# Patient Record
Sex: FEMALE | Race: WHITE | Employment: FULL TIME | URBAN - METROPOLITAN AREA
[De-identification: names, ages, dates, MRNs, and addresses within clinical notes are randomized per-mention and may not be internally consistent; named-entity substitution may affect disease eponyms.]

---

## 2021-06-11 ENCOUNTER — HOSPITAL ENCOUNTER (OUTPATIENT)
Dept: MAMMOGRAPHY | Age: 56
Discharge: HOME | End: 2021-06-11
Attending: FAMILY MEDICINE
Payer: COMMERCIAL

## 2021-06-11 DIAGNOSIS — Z12.31 VISIT FOR SCREENING MAMMOGRAM: ICD-10-CM

## 2021-06-11 PROCEDURE — 77067 SCR MAMMO BI INCL CAD: CPT

## 2022-06-20 RX ORDER — ATORVASTATIN CALCIUM 10 MG/1
TABLET, FILM COATED ORAL
Qty: 30 TABLET | OUTPATIENT
Start: 2022-06-20

## 2022-06-28 ENCOUNTER — TELEPHONE (OUTPATIENT)
Dept: PRIMARY CARE CLINIC | Facility: CLINIC | Age: 57
End: 2022-06-28

## 2022-06-28 RX ORDER — ATORVASTATIN CALCIUM 10 MG/1
10 TABLET, FILM COATED ORAL DAILY
Qty: 30 TABLET | Refills: 3 | Status: SHIPPED | OUTPATIENT
Start: 2022-06-28 | End: 2022-10-14 | Stop reason: SDUPTHER

## 2022-08-18 RX ORDER — LISINOPRIL 10 MG/1
TABLET ORAL
Qty: 30 TABLET | Refills: 5 | Status: SHIPPED | OUTPATIENT
Start: 2022-08-18 | End: 2022-10-14 | Stop reason: SDUPTHER

## 2022-09-21 RX ORDER — ATORVASTATIN CALCIUM 10 MG/1
10 TABLET, FILM COATED ORAL DAILY
Qty: 30 TABLET | Refills: 3 | OUTPATIENT
Start: 2022-09-21

## 2022-10-13 ENCOUNTER — TELEPHONE (OUTPATIENT)
Dept: PRIMARY CARE CLINIC | Facility: CLINIC | Age: 57
End: 2022-10-13

## 2022-10-13 NOTE — TELEPHONE ENCOUNTER
[facility]  Subject: Refill Request     QUESTIONS   Name of Medication? atorvastatin (LIPITOR) 10 MG tablet   Patient-reported dosage and instructions? 1 TABLET A DAY   How many days do you have left? 0   [id number]   Pharmacy phone number (if available)? [phone]   Additional Information for Provider? PATIENT HAS NO MORE REFILLS AND IS   COMPLETLY OUT OF THIS MEDICATION   ---------------------------------------------------------------------------   --------------   CALL BACK INFO   What is the best way for the office to contact you? OK to leave message on   voicemail   Preferred Call Back Phone Number?  [phone]

## 2022-10-14 ENCOUNTER — TELEPHONE (OUTPATIENT)
Dept: PRIMARY CARE CLINIC | Facility: CLINIC | Age: 57
End: 2022-10-14

## 2022-10-14 RX ORDER — ATORVASTATIN CALCIUM 10 MG/1
10 TABLET, FILM COATED ORAL DAILY
Qty: 30 TABLET | Refills: 5 | Status: SHIPPED | OUTPATIENT
Start: 2022-10-14

## 2022-10-14 RX ORDER — LISINOPRIL 10 MG/1
TABLET ORAL
Qty: 30 TABLET | Refills: 5 | Status: SHIPPED | OUTPATIENT
Start: 2022-10-14

## 2022-10-14 NOTE — TELEPHONE ENCOUNTER
Message  Received: 2 days ago  [facility]  Subject: Refill Request     QUESTIONS   Name of Medication? atorvastatin (LIPITOR) 10 MG tablet   Patient-reported dosage and instructions? 1 TABLET A DAY   How many days do you have left? 0   [id number]   Pharmacy phone number (if available)? [phone]   Additional Information for Provider? PATIENT HAS NO MORE REFILLS AND IS   COMPLETLY OUT OF THIS MEDICATION   ---------------------------------------------------------------------------   --------------   CALL BACK INFO   What is the best way for the office to contact you? OK to leave message on   voicemail   Preferred Call Back Phone Number? [phone]   ---------------------------------------------------------------------------   --------------   SCRIPT ANSWERS   Relationship to Patient?  Self

## 2022-11-09 ENCOUNTER — TELEPHONE (OUTPATIENT)
Dept: PRIMARY CARE CLINIC | Facility: CLINIC | Age: 57
End: 2022-11-09

## 2022-11-09 RX ORDER — METHYLPREDNISOLONE 4 MG/1
TABLET ORAL
Qty: 1 KIT | Refills: 0 | Status: SHIPPED | OUTPATIENT
Start: 2022-11-09 | End: 2022-11-15

## 2022-11-09 NOTE — TELEPHONE ENCOUNTER
She is having sciatic pain and nothing is helping. Used heating pad, biofreeze, icyhot and tylenol but nothing is helping.   What can you recommend that she try? [phone]

## 2022-11-15 ENCOUNTER — TELEPHONE (OUTPATIENT)
Dept: PRIMARY CARE CLINIC | Facility: CLINIC | Age: 57
End: 2022-11-15

## 2022-11-15 NOTE — TELEPHONE ENCOUNTER
Patient called and stated that her left leg pain is not any better. I offered to schedule her for an appointment and she stated that she could not get off work but she wants him to give her something else. Please call her at [phone].

## 2022-11-16 ENCOUNTER — TELEMEDICINE (OUTPATIENT)
Dept: PRIMARY CARE CLINIC | Facility: CLINIC | Age: 57
End: 2022-11-16

## 2022-11-16 DIAGNOSIS — M54.42 ACUTE LEFT-SIDED LOW BACK PAIN WITH LEFT-SIDED SCIATICA: Primary | ICD-10-CM

## 2022-11-16 DIAGNOSIS — N18.31 STAGE 3A CHRONIC KIDNEY DISEASE (HCC): ICD-10-CM

## 2022-11-16 PROBLEM — N18.30 CHRONIC RENAL DISEASE, STAGE III (HCC): Status: ACTIVE | Noted: 2022-11-16

## 2022-11-16 PROCEDURE — 99442 PR PHYS/QHP TELEPHONE EVALUATION 11-20 MIN: CPT | Performed by: FAMILY MEDICINE

## 2022-11-16 RX ORDER — TIZANIDINE 4 MG/1
4 TABLET ORAL EVERY 6 HOURS PRN
Qty: 30 TABLET | Refills: 2 | Status: SHIPPED | OUTPATIENT
Start: 2022-11-16

## 2022-11-16 ASSESSMENT — PATIENT HEALTH QUESTIONNAIRE - PHQ9
2. FEELING DOWN, DEPRESSED OR HOPELESS: 0
SUM OF ALL RESPONSES TO PHQ QUESTIONS 1-9: 0
1. LITTLE INTEREST OR PLEASURE IN DOING THINGS: 0
SUM OF ALL RESPONSES TO PHQ9 QUESTIONS 1 & 2: 0

## 2022-11-16 NOTE — PROGRESS NOTES
Zachariah Orozco is a 61 y.o. female evaluated via telephone on 11/16/2022 for Leg Pain (Pt that she is having leg pain, and sometimes it feels numb.)  . Documentation:  I communicated with the patient and/or health care decision maker about leg pain. Pt says could not go to work today, left leg toward hip, sciatic nerve, lower part of stomach base of abdomen. Pt says working and in pain. Pain goes down leg from form l2-L3 to hip to left leg above hip. Pt says sits a lot at work and was so bad she could not go to work today, pain was severe. Pt says leg seemed to be catching in am, hard to walk at first.    Pt is given some stretches for back and hip, also given tizanidine for muscle spasm. Pt has a knot on back in area of concern. Pt is referred to orthopaedic for evaluation. RTC in 1 week for recheck. Details of this discussion including any medical advice provided: as needed    Total Time: minutes: 11-20 minutes    Zachariah Orozco was evaluated through a synchronous (real-time) audio encounter. Patient identification was verified at the start of the visit. She (or guardian if applicable) is aware that this is a billable service, which includes applicable co-pays. This visit was conducted with the patient's (and/or legal guardian's) verbal consent. She has not had a related appointment within my department in the past 7 days or scheduled within the next 24 hours. The patient was located at Home: [address] [place] [address] [place] [address]. The provider was located at  ([address] [place], [place] [address]): [place] [facility]  [address]  [address]     Note: not billable if this call serves to triage the patient into an appointment for the relevant concern    Basilio Rausch MD

## 2022-11-18 ENCOUNTER — TELEPHONE (OUTPATIENT)
Dept: PRIMARY CARE CLINIC | Facility: CLINIC | Age: 57
End: 2022-11-18

## 2022-11-18 DIAGNOSIS — M54.50 ACUTE LOW BACK PAIN, UNSPECIFIED BACK PAIN LATERALITY, UNSPECIFIED WHETHER SCIATICA PRESENT: Primary | ICD-10-CM

## 2022-11-18 RX ORDER — MELOXICAM 15 MG/1
15 TABLET ORAL DAILY
Qty: 30 TABLET | Refills: 2 | Status: SHIPPED | OUTPATIENT
Start: 2022-11-18

## 2022-11-18 NOTE — TELEPHONE ENCOUNTER
----- Message from Chuy Casiano sent at 11/17/2022 11:33 AM EST -----  Subject: Message to Provider    QUESTIONS  Information for Provider? Nora Escobedo is wanting to know if she can get   prescribed an antinflammatory instead of muscle relaxer for her psiatic   nerve. The muscle relaxer is not working. She has been taking Tylenol and   using heating pad and that is not working either. Her pharmacy is   [facility] in [facility]. She also needs doctor note for missing work for   11/16, 11/17, and 11/18.  ---------------------------------------------------------------------------  --------------  [address] [employer]  [phone]; OK to leave message on voicemail  ---------------------------------------------------------------------------  --------------  SCRIPT ANSWERS  Relationship to Patient?  Self

## 2022-11-21 ENCOUNTER — TELEPHONE (OUTPATIENT)
Dept: PRIMARY CARE CLINIC | Facility: CLINIC | Age: 57
End: 2022-11-21

## 2022-11-21 NOTE — TELEPHONE ENCOUNTER
Please call patient concerning her back pain and the medication that DR. Mack Harada sent to the pharmacist.  She can be reached at [phone].

## 2022-11-23 ENCOUNTER — TELEMEDICINE (OUTPATIENT)
Dept: PRIMARY CARE CLINIC | Facility: CLINIC | Age: 57
End: 2022-11-23
Payer: COMMERCIAL

## 2022-11-23 DIAGNOSIS — M54.42 ACUTE LEFT-SIDED LOW BACK PAIN WITH LEFT-SIDED SCIATICA: Primary | ICD-10-CM

## 2022-11-23 PROCEDURE — 99441 PR PHYS/QHP TELEPHONE EVALUATION 5-10 MIN: CPT | Performed by: FAMILY MEDICINE

## 2022-11-23 RX ORDER — HYDROCODONE BITARTRATE AND ACETAMINOPHEN 10; 325 MG/1; MG/1
1 TABLET ORAL EVERY 6 HOURS PRN
Qty: 28 TABLET | Refills: 0 | Status: SHIPPED | OUTPATIENT
Start: 2022-11-23 | End: 2022-11-30

## 2022-11-23 NOTE — PROGRESS NOTES
Winnie Hansen is a 61 y.o. female evaluated via telephone on 11/23/2022 for No chief complaint on file. .        Documentation:  I communicated with the patient and/or health care decision maker about recheck. Pt is in pain, at work, sitting on heating pad, can't get any relief. Pt has been doing stretches. . Pt says no relief. Pt is given hydrocodone 10mg,to cut in half for 7 days until can see orthopaedic on Nov. 30.    Details of this discussion including any medical advice provided: as needed    Total Time: minutes: 5-10 minutes    Winnie Hansen was evaluated through a synchronous (real-time) audio encounter. Patient identification was verified at the start of the visit. She (or guardian if applicable) is aware that this is a billable service, which includes applicable co-pays. This visit was conducted with the patient's (and/or legal guardian's) verbal consent. She has not had a related appointment within my department in the past 7 days or scheduled within the next 24 hours. The patient was located at Home: [address] [place] [address] [address] [address]. The provider was located at [place] [address] ([address] [place], [place] [address]): [place] [address]..     Note: not billable if this call serves to triage the patient into an appointment for the relevant concern    Stephanie Goodwin MD

## 2022-11-30 ENCOUNTER — OFFICE VISIT (OUTPATIENT)
Dept: ORTHOPEDIC SURGERY | Age: 57
End: 2022-11-30
Payer: COMMERCIAL

## 2022-11-30 VITALS — WEIGHT: 120 LBS | HEIGHT: 61 IN | BODY MASS INDEX: 22.66 KG/M2

## 2022-11-30 DIAGNOSIS — M47.816 FACET ARTHROPATHY, LUMBAR: ICD-10-CM

## 2022-11-30 DIAGNOSIS — M43.16 SPONDYLOLISTHESIS OF LUMBAR REGION: ICD-10-CM

## 2022-11-30 DIAGNOSIS — M54.41 RIGHT-SIDED LOW BACK PAIN WITH RIGHT-SIDED SCIATICA, UNSPECIFIED CHRONICITY: Primary | ICD-10-CM

## 2022-11-30 DIAGNOSIS — M51.36 DDD (DEGENERATIVE DISC DISEASE), LUMBAR: ICD-10-CM

## 2022-11-30 DIAGNOSIS — M54.16 LUMBAR RADICULOPATHY: ICD-10-CM

## 2022-11-30 PROCEDURE — 99204 OFFICE O/P NEW MOD 45 MIN: CPT | Performed by: PHYSICIAN ASSISTANT

## 2022-11-30 RX ORDER — METHYLPREDNISOLONE 4 MG/1
TABLET ORAL
Qty: 1 KIT | Refills: 0 | Status: SHIPPED | OUTPATIENT
Start: 2022-11-30

## 2022-11-30 NOTE — LETTER
Patricia ROWE  1965  MRN [id number]                                              ROOM NUMBER______      Radiographic Studies:    Cervical MRI      Thoracic MRI         Lumbar MRI          Pelvis MRI        CONTRAST    CT Myelogram: _______________   NCS/EMG ________________ ( UE  /  LE )     MRI of ___________________          Other: ____________________      Injections:    KNEE    HIP  Depomedrol _____ mg Euflexxa _____    _______________ TFESI/SNRB  _______________ SI Joint  _______________ GUZMAN    _______________ Facet  _______________Piriformis/ Sciatica      Medications:    Oral Steroids _______________  NSAIDS _______________    Muscle Relaxers _______________  Neurontin/Lyrica _______________    Pain Medicine _______________  Other _______________                       Physical Therapy:    Lumbar     Thoracic      Cervical     Hip       Knee       Shoulder               Traction          Ultrasound          Dry Needling      Referral:    Pain referral:  [facility]   [facility]   Other: ______________________________    Follow-up/ Refer__________________________________________________    Authorization to hold blood thinners:___________________________________

## 2022-11-30 NOTE — PROGRESS NOTES
Name: Gregorio Rothman  YOB: 1965  Gender: female  MRN: [id number]    CC: Hip Pain (Left hip pain into left buttock and down leg)       HPI: This is a 61y.o. year old female who  has a past medical history of Hypercholesterolemia. .  She has 4-week history of left-sided hip pain left buttock lateral leg with numbness to her foot. Pain is worse with standing and walking. She did see her PCP. He prescribed a home exercise program.  Initially she was not able to participate in the exercises because she was in so much pain but now she is able to. He gave her an NSAID but she was afraid to take the NSAID but also put her on some pain medication. Pain initially was severe now it is reduced to 6 out of 10. She does feel some weakness of the left leg and some numbness and tingling in the left foot. She is not had prior back surgery. She does see a chiropractor on a pretty regular basis and it has not been of benefit recently. History was obtained by patient    The patient denies any change in bowel or bladder function since the onset of the symptoms. she  has not had lumbar surgery in the past.       AMB PAIN ASSESSMENT 11/30/2022   Location of Pain Hip   Location Modifiers Left   Severity of Pain 6   Quality of Pain Aching   Duration of Pain A few hours   Frequency of Pain Intermittent   Date Pain First Started 11/1/2022   Aggravating Factors Walking;Standing   Limiting Behavior No   Relieving Factors Other (Comment)   Result of Injury No   Work-Related Injury No   Are there other pain locations you wish to document? No            ROS/Meds/PSH/PMH/FH/SH: I personally reviewed the patient's collected intake data.   Below are the pertinents:    No Known Allergies      Current Outpatient Medications:     methylPREDNISolone (MEDROL DOSEPACK) 4 MG tablet, Take by mouth as directed., Disp: 1 kit, Rfl: 0    HYDROcodone-acetaminophen (NORCO)  MG per tablet, Take 1 tablet by mouth every 6 hours as needed for Pain for up to 7 days. Intended supply: 30 days, Disp: 28 tablet, Rfl: 0    atorvastatin (LIPITOR) 10 MG tablet, Take 1 tablet by mouth daily, Disp: 30 tablet, Rfl: 5    lisinopril (PRINIVIL;ZESTRIL) 10 MG tablet, TAKE 1 TABLET BY MOUTH DAILY FOR HIGH BLOOD PRESSURE, Disp: 30 tablet, Rfl: 5    ergocalciferol (ERGOCALCIFEROL) 1.25 MG (64775 UT) capsule, Take 50,000 Units by mouth, Disp: , Rfl:     zinc sulfate (ZINCATE) 220 (50 Zn) MG capsule, Take 220 mg by mouth daily, Disp: , Rfl:     meloxicam (MOBIC) 15 MG tablet, Take 1 tablet by mouth daily (Patient not taking: Reported on 11/30/2022), Disp: 30 tablet, Rfl: 2    tiZANidine (ZANAFLEX) 4 MG tablet, Take 1 tablet by mouth every 6 hours as needed (muscle spasm of back) TAKE 1 TABLET BY MOUTH EVERY 6 HOURS AS NEEDED FOR MUSCLE SPASMS, Disp: 30 tablet, Rfl: 2    cloNIDine (CATAPRES) 0.2 MG tablet, Take 0.2 mg by mouth 2 times daily (Patient not taking: Reported on 11/30/2022), Disp: , Rfl:     No past surgical history on file. Patient Active Problem List   Diagnosis    Ch[facility], [facility] ([facility] [214473]    Acute left-sided low back pain with left-sided sciatica         Tobacco:  reports that she has never smoked. She has never used smokeless tobacco.  Alcohol:   Social History     Substance and Sexual Activity   Alcohol Use Yes        Physical Exam:   BMI: Body mass index is 22.67 kg/m². GENERAL:  Adult in no acute distress, well developed, well nourished Patient is appropriately conversant  MSK:  Examination of the lumbar spine reveals scoliosis    There is moderate tenderness to palpation along the spinous processes and paraspinal musculature. The patient ambulates with a antalgic gait. ROM of bilateral hip(s) reveals no irritability. NEURO:  Cranial nerves grossly intact. No motor deficits.     Straight leg testing is positive left  Sensory testing reveals intact sensation to light touch and in the distribution of the L3-S1 dermatomes bilaterally  Ankle jerk is negative for clonus    Reflexes   Right Left   Quadriceps (L4) 2 2   Achilles (S1) 2 2     Strength testing in the lower extremity reveals the following based on the 5 point grading scale:     HF (L2) H Ab (L5) KE (L3/4) ADF (L4) EHL (L5) A Ev (S1) APF (S1)   Right 5 5 5 5 5 5 5   Left 5 5 5 5 5 5 5     PSYCH:  Alert and oriented X 3. Appropriate affect. Intact judgment and insight. Radiographic Studies:     AP, lateral and spot views of the lumbar spine:  11/30/22  AP of the lumbar spine shows levoscoliosis with significant degeneration in the lower lumbar spine L4-5 and L5-S1. There is some rotation at this level. Sagittal view of the lumbar spine shows anterior listhesis L3-4, L4-5 with degenerative changes through the segments. X-ray impression: Lumbar degenerative changes with spondylolisthesis          Assessment/Plan:         Diagnosis Orders   1. Right-sided low back pain with right-sided sciatica, unspecified chronicity  XR LUMBAR SPINE (2-3 VIEWS)    Amb External Referral To Physical Therapy      2. DDD (degenerative disc disease), lumbar  Amb External Referral To Physical Therapy      3. Spondylolisthesis of lumbar region  Amb External Referral To Physical Therapy      4. Lumbar radiculopathy  Amb External Referral To Physical Therapy      5. Facet arthropathy, lumbar  Amb External Referral To Physical Therapy            This patient's clinical history and physical exam is consistent with a left L4 and L-5 lumbar radiculopathy. X-rays reveal spondylolisthesis L3-4 and L4-5 with significant degenerative changes L3-S1. We discussed the natural history of lumbar radiculopathy in that many of these patients have near complete resolution of their symptoms within eight to twelve weeks with conservative care. We discussed that conservative treatments typically start with activity modification, and medication followed by physical therapy as symptoms allow. Oral and/or epidural steroids are other options. I also discussed potential surgical options if the symptoms fail to improve or there is a progressive neurologic deficit and conservative management has been exhausted. We discussed that surgery is not typically a reliable treatment for isolated back pain, but is usually very reliable in relieving buttock and leg symptoms.          - Physical Therapy was prescribed and will include stretching, strengthening and modalities to promote blood flow, flexibility and core strengthening.  - If the patient fails to respond to these efforts, I would recommend MRI of the lumbar spine for further evaluation.  - Oral Steroids: A short course of oral steroids was prescribed in an attempt to bring the acute radicular symptoms under control. The patient understands the risks and side effects of oral steroids including immunosuppression, hypertension, mood swings, increased blood sugar, including glaucoma. The steroid taper can be followed by NSAIDs once completed. 4 This is a undiagnosed new problem with uncertain prognosis    Orders Placed This Encounter   Medications    methylPREDNISolone (MEDROL DOSEPACK) 4 MG tablet     Sig: Take by mouth as directed. Dispense:  1 kit     Refill:  0        Orders Placed This Encounter   Procedures    XR LUMBAR SPINE (2-3 VIEWS)    Amb External Referral To Physical Therapy            Return in about 6 weeks (around 1/11/2023) for after PT with JODY. Victor M Frederick PA-C  11/30/22      Elements of this note were created using speech recognition software. As such, errors of speech recognition may be present.

## 2022-12-01 ENCOUNTER — TELEPHONE (OUTPATIENT)
Dept: ORTHOPEDIC SURGERY | Age: 57
End: 2022-12-01

## 2022-12-01 NOTE — TELEPHONE ENCOUNTER
She was seen yesterday and Karla prescribed a steroid pack but she did take it just a month ago. She has not started taking this one and is wondering she should still take this rx. Please call and let her know.

## 2023-03-21 RX ORDER — ATORVASTATIN CALCIUM 10 MG/1
10 TABLET, FILM COATED ORAL DAILY
Qty: 30 TABLET | Refills: 5 | Status: SHIPPED | OUTPATIENT
Start: 2023-03-21

## 2023-08-03 ENCOUNTER — COMMUNITY OUTREACH (OUTPATIENT)
Dept: PRIMARY CARE CLINIC | Facility: CLINIC | Age: 58
End: 2023-08-03

## 2023-12-13 RX ORDER — LISINOPRIL 10 MG/1
TABLET ORAL
Qty: 30 TABLET | Refills: 5 | OUTPATIENT
Start: 2023-12-13

## 2024-01-30 ENCOUNTER — TELEPHONE (OUTPATIENT)
Dept: PRIMARY CARE CLINIC | Facility: CLINIC | Age: 59
End: 2024-01-30

## 2024-01-30 DIAGNOSIS — R79.9 ABNORMAL BLOOD CHEMISTRY: Primary | ICD-10-CM

## 2024-01-30 DIAGNOSIS — R94.6 NONSPECIFIC ABNORMAL RESULTS OF THYROID FUNCTION STUDY: ICD-10-CM

## 2024-01-30 DIAGNOSIS — E55.9 VITAMIN D DEFICIENCY DISEASE: ICD-10-CM

## 2024-01-30 DIAGNOSIS — R53.83 FATIGUE, UNSPECIFIED TYPE: ICD-10-CM

## 2024-01-30 DIAGNOSIS — E78.5 HYPERLIPIDEMIA, UNSPECIFIED HYPERLIPIDEMIA TYPE: ICD-10-CM

## 2024-01-30 DIAGNOSIS — R73.01 IFG (IMPAIRED FASTING GLUCOSE): ICD-10-CM

## 2024-01-30 DIAGNOSIS — Z13.228 SCREENING FOR METABOLIC DISORDER: ICD-10-CM

## 2024-01-30 NOTE — TELEPHONE ENCOUNTER
Armida jorge [facility] Clinical Staff  Subject: Referral Request    Reason for referral request? Patient is wanting to have her lab orders put  in before her appointment on 02/06. She wants to know if she has to be  fasting or not. Please call her back once these are in. Thank you.  Provider patient wants to be referred to(if known):    Provider Phone Number(if known):    Additional Information for Provider?  ---------------------------------------------------------------------------  --------------  CALL BACK INFO    [phone]; OK to leave message on voicemail  -------------------------------------------------------------------------  ORDERS PLACED

## 2024-02-06 ENCOUNTER — OFFICE VISIT (OUTPATIENT)
Dept: PRIMARY CARE CLINIC | Facility: CLINIC | Age: 59
End: 2024-02-06
Payer: COMMERCIAL

## 2024-02-06 VITALS
SYSTOLIC BLOOD PRESSURE: 160 MMHG | OXYGEN SATURATION: 98 % | DIASTOLIC BLOOD PRESSURE: 90 MMHG | WEIGHT: 119 LBS | BODY MASS INDEX: 22.48 KG/M2

## 2024-02-06 DIAGNOSIS — Z12.31 SCREENING MAMMOGRAM FOR BREAST CANCER: ICD-10-CM

## 2024-02-06 DIAGNOSIS — R79.9 ABNORMAL BLOOD CHEMISTRY: ICD-10-CM

## 2024-02-06 DIAGNOSIS — R94.6 NONSPECIFIC ABNORMAL RESULTS OF THYROID FUNCTION STUDY: ICD-10-CM

## 2024-02-06 DIAGNOSIS — E78.5 HYPERLIPIDEMIA, UNSPECIFIED HYPERLIPIDEMIA TYPE: ICD-10-CM

## 2024-02-06 DIAGNOSIS — I10 PRIMARY HYPERTENSION: ICD-10-CM

## 2024-02-06 DIAGNOSIS — N18.31 STAGE 3A CHRONIC KIDNEY DISEASE (HCC): Primary | ICD-10-CM

## 2024-02-06 DIAGNOSIS — E55.9 VITAMIN D DEFICIENCY DISEASE: ICD-10-CM

## 2024-02-06 DIAGNOSIS — R53.83 FATIGUE, UNSPECIFIED TYPE: ICD-10-CM

## 2024-02-06 DIAGNOSIS — E78.2 MIXED HYPERLIPIDEMIA: ICD-10-CM

## 2024-02-06 DIAGNOSIS — Z13.228 SCREENING FOR METABOLIC DISORDER: ICD-10-CM

## 2024-02-06 DIAGNOSIS — R73.01 IFG (IMPAIRED FASTING GLUCOSE): ICD-10-CM

## 2024-02-06 DIAGNOSIS — Z12.11 ENCOUNTER FOR FIT (FECAL IMMUNOCHEMICAL TEST) SCREENING: ICD-10-CM

## 2024-02-06 LAB
25(OH)D3 SERPL-MCNC: 37.9 NG/ML (ref 30–100)
ALBUMIN SERPL-MCNC: 4.4 G/DL (ref 3.5–5)
ALBUMIN/GLOB SERPL: 1.1 (ref 0.4–1.6)
ALP SERPL-CCNC: 143 U/L (ref 50–136)
ALT SERPL-CCNC: 29 U/L (ref 12–65)
ANION GAP SERPL CALC-SCNC: 4 MMOL/L (ref 2–11)
AST SERPL-CCNC: 28 U/L (ref 15–37)
BASOPHILS # BLD: 0 K/UL (ref 0–0.2)
BASOPHILS NFR BLD: 1 % (ref 0–2)
BILIRUB SERPL-MCNC: 0.8 MG/DL (ref 0.2–1.1)
BUN SERPL-MCNC: 21 MG/DL (ref 6–23)
CALCIUM SERPL-MCNC: 10.1 MG/DL (ref 8.3–10.4)
CHLORIDE SERPL-SCNC: 108 MMOL/L (ref 103–113)
CHOLEST SERPL-MCNC: 275 MG/DL
CO2 SERPL-SCNC: 26 MMOL/L (ref 21–32)
CREAT SERPL-MCNC: 1.2 MG/DL (ref 0.6–1)
DIFFERENTIAL METHOD BLD: ABNORMAL
EOSINOPHIL # BLD: 0.1 K/UL (ref 0–0.8)
EOSINOPHIL NFR BLD: 1 % (ref 0.5–7.8)
ERYTHROCYTE [DISTWIDTH] IN BLOOD BY AUTOMATED COUNT: 13.2 % (ref 11.9–14.6)
GLOBULIN SER CALC-MCNC: 4 G/DL (ref 2.8–4.5)
GLUCOSE SERPL-MCNC: 104 MG/DL (ref 65–100)
HCT VFR BLD AUTO: 42.7 % (ref 35.8–46.3)
HDLC SERPL-MCNC: 114 MG/DL (ref 40–60)
HDLC SERPL: 2.4
HGB BLD-MCNC: 14 G/DL (ref 11.7–15.4)
IMM GRANULOCYTES # BLD AUTO: 0 K/UL (ref 0–0.5)
IMM GRANULOCYTES NFR BLD AUTO: 0 % (ref 0–5)
LDLC SERPL CALC-MCNC: 139.2 MG/DL
LYMPHOCYTES # BLD: 1.1 K/UL (ref 0.5–4.6)
LYMPHOCYTES NFR BLD: 19 % (ref 13–44)
MCH RBC QN AUTO: 32.5 PG (ref 26.1–32.9)
MCHC RBC AUTO-ENTMCNC: 32.8 G/DL (ref 31.4–35)
MCV RBC AUTO: 99.1 FL (ref 82–102)
MONOCYTES # BLD: 0.3 K/UL (ref 0.1–1.3)
MONOCYTES NFR BLD: 6 % (ref 4–12)
NEUTS SEG # BLD: 4.1 K/UL (ref 1.7–8.2)
NEUTS SEG NFR BLD: 73 % (ref 43–78)
NRBC # BLD: 0 K/UL (ref 0–0.2)
PLATELET # BLD AUTO: 255 K/UL (ref 150–450)
PMV BLD AUTO: 9.3 FL (ref 9.4–12.3)
POTASSIUM SERPL-SCNC: 4.3 MMOL/L (ref 3.5–5.1)
PROT SERPL-MCNC: 8.4 G/DL (ref 6.3–8.2)
RBC # BLD AUTO: 4.31 M/UL (ref 4.05–5.2)
SODIUM SERPL-SCNC: 138 MMOL/L (ref 136–146)
T4 FREE SERPL-MCNC: 0.9 NG/DL (ref 0.78–1.46)
TRIGL SERPL-MCNC: 109 MG/DL (ref 35–150)
TSH, 3RD GENERATION: 6.59 UIU/ML (ref 0.36–3.74)
VLDLC SERPL CALC-MCNC: 21.8 MG/DL (ref 6–23)
WBC # BLD AUTO: 5.7 K/UL (ref 4.3–11.1)

## 2024-02-06 PROCEDURE — 3080F DIAST BP >= 90 MM HG: CPT | Performed by: FAMILY MEDICINE

## 2024-02-06 PROCEDURE — 99214 OFFICE O/P EST MOD 30 MIN: CPT | Performed by: FAMILY MEDICINE

## 2024-02-06 PROCEDURE — 3077F SYST BP >= 140 MM HG: CPT | Performed by: FAMILY MEDICINE

## 2024-02-06 RX ORDER — LISINOPRIL 10 MG/1
TABLET ORAL
Qty: 60 TABLET | Refills: 5 | Status: SHIPPED | OUTPATIENT
Start: 2024-02-06

## 2024-02-06 RX ORDER — ATORVASTATIN CALCIUM 10 MG/1
10 TABLET, FILM COATED ORAL DAILY
Qty: 30 TABLET | Refills: 5 | Status: SHIPPED | OUTPATIENT
Start: 2024-02-06

## 2024-02-06 ASSESSMENT — PATIENT HEALTH QUESTIONNAIRE - PHQ9
SUM OF ALL RESPONSES TO PHQ QUESTIONS 1-9: 0
SUM OF ALL RESPONSES TO PHQ9 QUESTIONS 1 & 2: 0
SUM OF ALL RESPONSES TO PHQ QUESTIONS 1-9: 0
2. FEELING DOWN, DEPRESSED OR HOPELESS: 0
1. LITTLE INTEREST OR PLEASURE IN DOING THINGS: 0
SUM OF ALL RESPONSES TO PHQ QUESTIONS 1-9: 0
SUM OF ALL RESPONSES TO PHQ QUESTIONS 1-9: 0

## 2024-02-06 NOTE — PROGRESS NOTES
Vaping Use: Never used   Substance and Sexual Activity    Alcohol use: Yes    Drug use: Never    Sexual activity: Yes     Partners: Male   Other Topics Concern    Not on file   Social History Narrative    Not on file     Social Determinants of Health     Financial Resource Strain: Not on file   Food Insecurity: Not on file   Transportation Needs: Not on file   Physical Activity: Not on file   Stress: Not on file   Social Connections: Not on file   Intimate Partner Violence: Not on file   Housing Stability: Not on file     Current Outpatient Medications   Medication Sig Dispense Refill    atorvastatin (LIPITOR) 10 MG tablet Take 1 tablet by mouth daily 30 tablet 5    lisinopril (PRINIVIL;ZESTRIL) 10 MG tablet TAKE 1 tablet in am and one in pm for BP po 60 tablet 5    cloNIDine (CATAPRES) 0.2 MG tablet Take 1 tablet by mouth 2 times daily      ergocalciferol (ERGOCALCIFEROL) 1.25 MG (62987 UT) capsule Take 1 capsule by mouth      zinc sulfate (ZINCATE) 220 (50 Zn) MG capsule Take 220 mg by mouth daily      tiZANidine (ZANAFLEX) 4 MG tablet Take 1 tablet by mouth every 6 hours as needed (muscle spasm of back) TAKE 1 TABLET BY MOUTH EVERY 6 HOURS AS NEEDED FOR MUSCLE SPASMS (Patient not taking: Reported on 2/6/2024) 30 tablet 2     No current facility-administered medications for this visit.         REVIEW OF SYSTEMS:  Review of systems is as indicated in HPI otherwise negative.    PHYSICAL EXAM:  Vital Signs - BP (!) 160/90 (Site: Left Upper Arm, Position: Sitting, Cuff Size: Large Adult)   Wt 54 kg (119 lb)   SpO2 98%   BMI 22.48 kg/m²      Physical Exam  Vitals and nursing note reviewed.   Constitutional:       General: She is in acute distress.      Appearance: Normal appearance. She is normal weight.      Comments: Pleasant but somewhat anxious 57 yo female, whose BP is elevated, this concern is addressed today, see HPI   HENT:      Head: Normocephalic and atraumatic.      Nose: Nose normal.

## 2024-02-07 LAB
EST. AVERAGE GLUCOSE BLD GHB EST-MCNC: 97 MG/DL
HBA1C MFR BLD: 5 % (ref 4.8–5.6)

## 2024-02-11 PROBLEM — E78.2 MIXED HYPERLIPIDEMIA: Status: ACTIVE | Noted: 2024-02-11

## 2024-02-11 PROBLEM — I10 PRIMARY HYPERTENSION: Status: ACTIVE | Noted: 2024-02-11

## 2024-02-11 PROBLEM — Z12.11 ENCOUNTER FOR FIT (FECAL IMMUNOCHEMICAL TEST) SCREENING: Status: ACTIVE | Noted: 2024-02-11

## 2024-02-11 PROBLEM — Z12.31 SCREENING MAMMOGRAM FOR BREAST CANCER: Status: ACTIVE | Noted: 2024-02-11

## 2024-02-27 NOTE — PROGRESS NOTES
Continue current diabetes medications.    Check blood sugars before meals and bedtime.   Call if blood sugars are frequently less than 70 or above 200.  Follow consistent carbohydrate diet.  Brisk walk 30 minutes a day, 5 days a week.    Call if you need prescriptions for medications.  Carry glucose tablets or snacks with you at all times.     Follow-up in 4-5 months.           Negative mammogram, fbenign, follow up in one year.

## 2024-03-08 ENCOUNTER — HOSPITAL ENCOUNTER (OUTPATIENT)
Dept: MAMMOGRAPHY | Age: 59
Discharge: HOME | End: 2024-03-08
Attending: FAMILY MEDICINE
Payer: COMMERCIAL

## 2024-03-08 VITALS — BODY MASS INDEX: 22.47 KG/M2 | WEIGHT: 119 LBS | HEIGHT: 61 IN

## 2024-03-08 DIAGNOSIS — Z12.31 SCREENING MAMMOGRAM FOR BREAST CANCER: ICD-10-CM

## 2024-03-08 PROCEDURE — 77067 SCR MAMMO BI INCL CAD: CPT

## 2024-03-08 PROCEDURE — 77063 BREAST TOMOSYNTHESIS BI: CPT

## 2024-03-12 PROBLEM — Z12.11 ENCOUNTER FOR FIT (FECAL IMMUNOCHEMICAL TEST) SCREENING: Status: RESOLVED | Noted: 2024-02-11 | Resolved: 2024-03-12

## 2024-03-13 NOTE — RESULT ENCOUNTER NOTE
Please let the patient know:    IMPRESSION:  No suspicious findings on screening mammogram.    RECOMMENDATION:  Screening mammogram in one year.    Rystot message sent as well    Explanatory note: Be assured that the information provided to create your medical record comes from your provider. The written transcription portion of this note is prepared electronically by voice-recognition software. At times, there may be some errors in capitalization, punctuation, tense, or context that are inherent in the system, but your provider reviews the note for content and to ensure that the note contains appropriate information for your continuing care.    If any special recommendations were made during the most recent visit, I recommend following up with provider. This is a generalized interpretation of your lab results.

## 2024-05-02 ENCOUNTER — OFFICE VISIT (OUTPATIENT)
Age: 59
End: 2024-05-02
Payer: COMMERCIAL

## 2024-05-02 VITALS — HEIGHT: 61 IN | BODY MASS INDEX: 22.09 KG/M2 | WEIGHT: 117 LBS

## 2024-05-02 DIAGNOSIS — S52.501A CLOSED FRACTURE OF DISTAL END OF RIGHT RADIUS, UNSPECIFIED FRACTURE MORPHOLOGY, INITIAL ENCOUNTER: Primary | ICD-10-CM

## 2024-05-02 PROCEDURE — 99204 OFFICE O/P NEW MOD 45 MIN: CPT | Performed by: ORTHOPAEDIC SURGERY

## 2024-05-02 NOTE — PROGRESS NOTES
Orthopaedic Hand Clinic Note    Name: Mary Grace Edward  YOB: 1965  Gender: female  MRN: [id number]      CC: Patient referred for evaluation of hand pain    HPI: Mary Grace Edward is a 58 y.o. female with a chief complaint of right wrist pain. The injury occurred 1 week ago when the patient fell. The pain is located diffusely about the wrist. Denies numbness or paresthesias of the fingers. Evaluation has included x-rays. Treatment to date has included splint. Denies loss of consciousness, head injury or neck pain.      ROS/Meds/PSH/PMH/FH/SH: I personally reviewed the patients standard intake form.  Pertinents are discussed in the HPI    Physical Examination  Musculoskeletal Exam:  Examination of the right upper extremity demonstrates no open wounds. Sensation is intact throughout, cap refill in all fingers < 5 seconds. Finger motion is limited with mild swelling of the hand and fingers. Tenderness over right  distal radius. negative tenderness over the ulnar aspect of the wrist. No tenderness at elbow, anatomic snuffbox, hand, digits    Imaging / Electrodiagnostic Tests:     I independently reviewed and interpreted radiographs of the right wrist.  They demonstrate distal radius fracture with mild dorsal angulation     Assessment:     ICD-10-CM    1. Closed fracture of distal end of right radius, unspecified fracture morphology, initial encounter  S52.501A [place] [facility]          Plan:   We discussed the diagnosis and different treatment options. We discussed observation, casting, further imaging, and surgical fixation and the risks, benefits and alternatives of all these options.  She strongly wishes to avoid surgery  After discussing in detail the patient elects to proceed with nonsurgical treatment with placement into a brace. Brace should remain in place at all times except for hygiene. She can perform finger motion as tolerated, but should avoid any lifting with the right hand. She will

## 2024-05-09 ENCOUNTER — OFFICE VISIT (OUTPATIENT)
Age: 59
End: 2024-05-09
Payer: COMMERCIAL

## 2024-05-09 DIAGNOSIS — S52.501A CLOSED FRACTURE OF DISTAL END OF RIGHT RADIUS, UNSPECIFIED FRACTURE MORPHOLOGY, INITIAL ENCOUNTER: Primary | ICD-10-CM

## 2024-05-09 PROCEDURE — 99213 OFFICE O/P EST LOW 20 MIN: CPT | Performed by: ORTHOPAEDIC SURGERY

## 2024-05-09 NOTE — PROGRESS NOTES
Orthopaedic Hand Clinic Note    Name: Mary Grace Edward  YOB: 1965  Gender: female  MRN: [id number]      Follow up visit:   1. Closed fracture of distal end of right radius, unspecified fracture morphology, initial encounter        HPI: Mary Grace Edward is a 58 y.o. female who is following up for right distal radius fracture. She has been comfortable in her brace.      ROS/Meds/PSH/PMH/FH/SH: I personally reviewed the patients standard intake form.  Pertinents are discussed in the HPI    Physical Examination:    Musculoskeletal Examination:  Examination on the right upper extremity demonstrates cap refill < 5 seconds in all fingers, she is tender to palpation at the distal radius. She is able to fully flex and extend all digits. EPL is intact.    Imaging / Electrodiagnostic Tests:     Wrist XR: AP, Lateral, Oblique views     Clinical Indication:  1. Closed fracture of distal end of right radius, unspecified fracture morphology, initial encounter           Report: AP, lateral, oblique x-ray of the right wrist demonstrates distal radius fracture with mild dorsal angulation, no change in alignment since prior radiographs    Impression: as above     Mary Lou Vergara MD         Assessment:     ICD-10-CM    1. Closed fracture of distal end of right radius, unspecified fracture morphology, initial encounter  S52.501A XR WRIST RIGHT (MIN 3 VIEWS)          Plan:   We discussed the diagnosis and different treatment options. We discussed observation, therapy, antiinflammatory medications and other pertinent treatment modalities.    After discussing in detail the patient elects to proceed with continued nonsurgical treatment. Brace should remain in place at all times except for hygiene. She can perform finger motion as tolerated, but should avoid any lifting with the right hand. She will follow up in 1 week for repeat radiographs. She understands that fracture alignment may worsen and this may necessitate surgical

## 2024-05-16 ENCOUNTER — OFFICE VISIT (OUTPATIENT)
Age: 59
End: 2024-05-16
Payer: COMMERCIAL

## 2024-05-16 DIAGNOSIS — S52.501A CLOSED FRACTURE OF DISTAL END OF RIGHT RADIUS, UNSPECIFIED FRACTURE MORPHOLOGY, INITIAL ENCOUNTER: Primary | ICD-10-CM

## 2024-05-16 PROCEDURE — 99213 OFFICE O/P EST LOW 20 MIN: CPT | Performed by: ORTHOPAEDIC SURGERY

## 2024-05-16 NOTE — PROGRESS NOTES
[facility] Note    Name: Mary Grace Edward  YOB: 1965  Gender: female  MRN: [id number]      Follow up visit:   1. Closed fracture of distal end of right radius, unspecified fracture morphology, initial encounter        HPI: Mary Grace Edward is a 58 y.o. female who is following up for right distal radius fracture. She has been comfortable in her brace.      ROS/Meds/PSH/PMH/FH/SH: I personally reviewed the patients standard intake form.  Pertinents are discussed in the HPI    Physical Examination:    Musculoskeletal Examination:  Examination on the right upper extremity demonstrates cap refill < 5 seconds in all fingers, she is tender to palpation at the distal radius. She is able to fully flex and extend all digits. EPL is intact.    Imaging / Electrodiagnostic Tests:     Wrist XR: AP, Lateral, Oblique views     Clinical Indication:  1. Closed fracture of distal end of right radius, unspecified fracture morphology, initial encounter           Report: AP, lateral, oblique x-ray of the right wrist demonstrates distal radius fracture with mild dorsal angulation, no change in alignment since prior radiographs    Impression: as above     Mary Lou Vergara MD         Assessment:     ICD-10-CM    1. Closed fracture of distal end of right radius, unspecified fracture morphology, initial encounter  S52.501A XR WRIST RIGHT (MIN 3 VIEWS)          Plan:   We discussed the diagnosis and different treatment options. We discussed observation, therapy, antiinflammatory medications and other pertinent treatment modalities.    After discussing in detail the patient elects to proceed with continued nonsurgical treatment. Brace should remain in place at all times except for hygiene. She can perform finger motion as tolerated, but should avoid any lifting with the right hand. She will follow up in 2 week for repeat radiographs. She understands that fracture alignment may worsen and this may necessitate surgical

## 2024-05-30 ENCOUNTER — TELEPHONE (OUTPATIENT)
Dept: PRIMARY CARE CLINIC | Facility: CLINIC | Age: 59
End: 2024-05-30

## 2024-05-30 NOTE — TELEPHONE ENCOUNTER
Called pt to reschedule 8/6/24 appt due to Dr. Ch being ooo -  did not answer - lvm to call back to reschedule    Sending Nintu Oyt message and letter

## 2024-06-06 ENCOUNTER — OFFICE VISIT (OUTPATIENT)
Age: 59
End: 2024-06-06
Payer: COMMERCIAL

## 2024-06-06 DIAGNOSIS — S52.501A CLOSED FRACTURE OF DISTAL END OF RIGHT RADIUS, UNSPECIFIED FRACTURE MORPHOLOGY, INITIAL ENCOUNTER: Primary | ICD-10-CM

## 2024-06-06 PROCEDURE — 99213 OFFICE O/P EST LOW 20 MIN: CPT | Performed by: ORTHOPAEDIC SURGERY

## 2024-06-06 NOTE — PROGRESS NOTES
will follow-up as needed    Patient voiced accordance and understanding of the information provided and the formulated plan. All questions were answered to the patient's satisfaction during the encounter.      Mary Lou Vergara MD  Orthopaedic Surgery  06/06/24  3:52 PM

## 2024-06-13 ENCOUNTER — EVALUATION (OUTPATIENT)
Age: 59
End: 2024-06-13
Payer: COMMERCIAL

## 2024-06-13 DIAGNOSIS — M25.431 EFFUSION OF RIGHT WRIST: ICD-10-CM

## 2024-06-13 DIAGNOSIS — M25.531 PAIN IN RIGHT WRIST: Primary | ICD-10-CM

## 2024-06-13 DIAGNOSIS — M25.631 STIFFNESS OF RIGHT WRIST JOINT: ICD-10-CM

## 2024-06-13 DIAGNOSIS — M62.81 HAND MUSCLE WEAKNESS: ICD-10-CM

## 2024-06-13 PROCEDURE — 97110 THERAPEUTIC EXERCISES: CPT | Performed by: OCCUPATIONAL THERAPIST

## 2024-06-13 PROCEDURE — 97165 OT EVAL LOW COMPLEX 30 MIN: CPT | Performed by: OCCUPATIONAL THERAPIST

## 2024-06-13 NOTE — PROGRESS NOTES
Seated Elbow Flexion and Extension AROM  - 4-5 x daily - 7 x weekly - 1 sets - 10 reps - 5 hold  - Seated Forearm Pronation and Supination AROM  - 4-5 x daily - 7 x weekly - 1 sets - 10 reps - 5 hold  - Wrist Flexion Extension AROM - Palms Down  - 4-5 x daily - 7 x weekly - 1 sets - 10 reps - 5 hold  - Seated Wrist Flexion AROM  - 4-5 x daily - 7 x weekly - 1 sets - 10 reps - 5 hold  - Seated Wrist Radial and Ulnar Deviation AROM  - 4-5 x daily - 7 x weekly - 1 sets - 10 reps - 5 hold  - Digit Flexion Extension  - 4-5 x daily - 7 x weekly - 1 sets - 10 reps - 5 hold  - Seated Wrist Flexion Stretch  - 3 x daily - 7 x weekly - 1 sets - 5 reps - 10 hold  - Wrist Prayer Stretch  - 3 x daily - 7 x weekly - 1 sets - 5 reps - 10 hold  - Seated Wrist Extension PROM  - 3 x daily - 7 x weekly - 1 sets - 5 reps - 10 hold    CLINICAL DECISION MAKING/ASSESSMENT     Performance Deficits  Physical: Mobility and Strength  Cognitive: No deficiencies noted  Psychosocial: No deficiencies noted  Rehab potential: good    The patient presents s/p right wrist fracture.  The patient presents with pain, stiffness, swelling, weakness right hand/wrist. These deficits appear to be secondary to fx.  Based on these subjective and objective findings, the patient is perceived as currently having a primary functional deficit of  45% dysfunction.     After a brief chart/PMH and OT profile review, evaluation requiring minimal  assistance/modifications and simple patient and data analysis, I have determined the patient exhibits several (1-3) performance deficits. Comorbidities do not affect the patient's occupational performance. The following performance deficits (including but not limited to physical, cognitive and/or psychosocial components) result in activity limitations and participation restrictions: Mobility and Strength    The patient would benefit from skilled occupational therapy services to address the deficits noted above for return to

## 2024-07-01 ENCOUNTER — TELEPHONE (OUTPATIENT)
Age: 59
End: 2024-07-01

## 2024-07-01 NOTE — TELEPHONE ENCOUNTER
Left voice mail for pt to reschedule this week if possible. Requested pt to call to verify next appt as well

## 2025-05-07 ENCOUNTER — TRANSCRIBE ORDERS (OUTPATIENT)
Dept: SCHEDULING | Age: 60
End: 2025-05-07

## 2025-05-07 DIAGNOSIS — Z12.31 OTHER SCREENING MAMMOGRAM: Primary | ICD-10-CM
